# Patient Record
Sex: FEMALE | Race: WHITE | NOT HISPANIC OR LATINO | Employment: UNEMPLOYED | URBAN - METROPOLITAN AREA
[De-identification: names, ages, dates, MRNs, and addresses within clinical notes are randomized per-mention and may not be internally consistent; named-entity substitution may affect disease eponyms.]

---

## 2021-01-21 ENCOUNTER — HOSPITAL ENCOUNTER (OUTPATIENT)
Dept: URGENT CARE | Facility: CLINIC | Age: 31
Discharge: HOME | End: 2021-01-21
Attending: NURSE PRACTITIONER

## 2022-04-06 PROCEDURE — 87491 CHLMYD TRACH DNA AMP PROBE: CPT | Performed by: PHYSICIAN ASSISTANT

## 2022-04-06 PROCEDURE — 87591 N.GONORRHOEAE DNA AMP PROB: CPT | Performed by: PHYSICIAN ASSISTANT

## 2022-04-06 PROCEDURE — 87510 GARDNER VAG DNA DIR PROBE: CPT | Performed by: PHYSICIAN ASSISTANT

## 2022-04-06 PROCEDURE — 87480 CANDIDA DNA DIR PROBE: CPT | Performed by: PHYSICIAN ASSISTANT

## 2022-04-06 PROCEDURE — 87255 GENET VIRUS ISOLATE HSV: CPT | Performed by: PHYSICIAN ASSISTANT

## 2022-04-06 PROCEDURE — 86592 SYPHILIS TEST NON-TREP QUAL: CPT | Performed by: PHYSICIAN ASSISTANT

## 2022-04-06 PROCEDURE — 80074 ACUTE HEPATITIS PANEL: CPT | Performed by: PHYSICIAN ASSISTANT

## 2022-04-06 PROCEDURE — G0432 EIA HIV-1/HIV-2 SCREEN: HCPCS | Performed by: PHYSICIAN ASSISTANT

## 2022-04-06 PROCEDURE — 87660 TRICHOMONAS VAGIN DIR PROBE: CPT | Performed by: PHYSICIAN ASSISTANT

## 2022-04-07 ENCOUNTER — TELEPHONE (OUTPATIENT)
Dept: URGENT CARE | Facility: CLINIC | Age: 32
End: 2022-04-07

## 2022-04-08 ENCOUNTER — TELEPHONE (OUTPATIENT)
Dept: URGENT CARE | Facility: CLINIC | Age: 32
End: 2022-04-08

## 2022-04-08 NOTE — TELEPHONE ENCOUNTER
----- Message from ISREAL Beltran sent at 4/7/2022  9:33 AM EDT -----  Attempted to contact patient regarding positive results of 4/7/2022 at 9:32 AM.  Message left on voicemail to return call.  Please advise patient she has tested positive for gonorrhea, trichomonas, and bacterial vaginosis.  Medication has been sent to the pharmacy to treat trichomonas and bacterial vaginosis.  Patient must return to the clinic for a Rocephin 500 mg IM injection to treat gonorrhea.  Please advise patient to refrain from sexual activity until 1 week after all treatment is completed.

## 2022-04-10 ENCOUNTER — TELEPHONE (OUTPATIENT)
Dept: URGENT CARE | Facility: CLINIC | Age: 32
End: 2022-04-10

## 2022-04-10 NOTE — TELEPHONE ENCOUNTER
Spoke with Magaly, patient's cousin (her mother was listed as alternate contact for patient) and asked her to have patient contact the clinic to discuss test results.     Plan: continue valtrex as prescribed. Contact primary care provider if symptoms persist or return.

## 2022-04-11 ENCOUNTER — TELEPHONE (OUTPATIENT)
Dept: URGENT CARE | Facility: CLINIC | Age: 32
End: 2022-04-11

## 2022-04-11 NOTE — TELEPHONE ENCOUNTER
Discussed results with patient for HSV, gonorrhea, trichomonas, and bacterial vaginosis.  She has a prescription for Flagyl at the pharmacy and was instructed to return to the clinic to receive a Rocephin shot.  Order for Rocephin placed in computer system.

## 2023-09-29 ENCOUNTER — HOSPITAL ENCOUNTER (EMERGENCY)
Facility: HOSPITAL | Age: 33
Discharge: LEFT BEFORE DISCHARGE | End: 2023-09-29
Payer: MEDICAID

## 2023-09-29 PROCEDURE — 99211 OFF/OP EST MAY X REQ PHY/QHP: CPT

## 2024-06-26 ENCOUNTER — APPOINTMENT (OUTPATIENT)
Dept: CT IMAGING | Facility: HOSPITAL | Age: 34
End: 2024-06-26
Payer: MEDICAID

## 2024-06-26 ENCOUNTER — HOSPITAL ENCOUNTER (EMERGENCY)
Facility: HOSPITAL | Age: 34
Discharge: HOME | End: 2024-06-27
Attending: EMERGENCY MEDICINE
Payer: MEDICAID

## 2024-06-26 DIAGNOSIS — N83.202 LEFT OVARIAN CYST: ICD-10-CM

## 2024-06-26 DIAGNOSIS — N12 PYELONEPHRITIS OF LEFT KIDNEY: Primary | ICD-10-CM

## 2024-06-26 DIAGNOSIS — R50.9 FEBRILE ILLNESS: ICD-10-CM

## 2024-06-26 LAB
ALBUMIN SERPL-MCNC: 3.3 G/DL (ref 3.5–5.2)
ALBUMIN/GLOB SERPL: 1.1 G/DL
ALP SERPL-CCNC: 85 U/L (ref 39–117)
ALT SERPL W P-5'-P-CCNC: 10 U/L (ref 1–33)
ANION GAP SERPL CALCULATED.3IONS-SCNC: 7.6 MMOL/L (ref 5–15)
AST SERPL-CCNC: 10 U/L (ref 1–32)
BACTERIA UR QL AUTO: ABNORMAL /HPF
BASOPHILS # BLD AUTO: 0.03 10*3/MM3 (ref 0–0.2)
BASOPHILS NFR BLD AUTO: 0.2 % (ref 0–1.5)
BILIRUB SERPL-MCNC: 0.2 MG/DL (ref 0–1.2)
BILIRUB UR QL STRIP: NEGATIVE
BUN SERPL-MCNC: 9 MG/DL (ref 6–20)
BUN/CREAT SERPL: 23.7 (ref 7–25)
CALCIUM SPEC-SCNC: 8.5 MG/DL (ref 8.6–10.5)
CHLORIDE SERPL-SCNC: 103 MMOL/L (ref 98–107)
CLARITY UR: ABNORMAL
CO2 SERPL-SCNC: 28.4 MMOL/L (ref 22–29)
COLOR UR: YELLOW
CREAT SERPL-MCNC: 0.38 MG/DL (ref 0.57–1)
DEPRECATED RDW RBC AUTO: 46.2 FL (ref 37–54)
EGFRCR SERPLBLD CKD-EPI 2021: 135.9 ML/MIN/1.73
EOSINOPHIL # BLD AUTO: 0.16 10*3/MM3 (ref 0–0.4)
EOSINOPHIL NFR BLD AUTO: 1.3 % (ref 0.3–6.2)
ERYTHROCYTE [DISTWIDTH] IN BLOOD BY AUTOMATED COUNT: 13.4 % (ref 12.3–15.4)
GLOBULIN UR ELPH-MCNC: 3.1 GM/DL
GLUCOSE SERPL-MCNC: 117 MG/DL (ref 65–99)
GLUCOSE UR STRIP-MCNC: NEGATIVE MG/DL
HCG INTACT+B SERPL-ACNC: <0.5 MIU/ML
HCT VFR BLD AUTO: 34.6 % (ref 34–46.6)
HGB BLD-MCNC: 11.4 G/DL (ref 12–15.9)
HGB UR QL STRIP.AUTO: NEGATIVE
HOLD SPECIMEN: NORMAL
HOLD SPECIMEN: NORMAL
HYALINE CASTS UR QL AUTO: ABNORMAL /LPF
IMM GRANULOCYTES # BLD AUTO: 0.04 10*3/MM3 (ref 0–0.05)
IMM GRANULOCYTES NFR BLD AUTO: 0.3 % (ref 0–0.5)
KETONES UR QL STRIP: NEGATIVE
LEUKOCYTE ESTERASE UR QL STRIP.AUTO: ABNORMAL
LIPASE SERPL-CCNC: 21 U/L (ref 13–60)
LYMPHOCYTES # BLD AUTO: 1.65 10*3/MM3 (ref 0.7–3.1)
LYMPHOCYTES NFR BLD AUTO: 12.9 % (ref 19.6–45.3)
MCH RBC QN AUTO: 31 PG (ref 26.6–33)
MCHC RBC AUTO-ENTMCNC: 32.9 G/DL (ref 31.5–35.7)
MCV RBC AUTO: 94 FL (ref 79–97)
MONOCYTES # BLD AUTO: 1.48 10*3/MM3 (ref 0.1–0.9)
MONOCYTES NFR BLD AUTO: 11.6 % (ref 5–12)
NEUTROPHILS NFR BLD AUTO: 73.7 % (ref 42.7–76)
NEUTROPHILS NFR BLD AUTO: 9.41 10*3/MM3 (ref 1.7–7)
NITRITE UR QL STRIP: POSITIVE
NRBC BLD AUTO-RTO: 0 /100 WBC (ref 0–0.2)
PH UR STRIP.AUTO: 6.5 [PH] (ref 5–8)
PLATELET # BLD AUTO: 256 10*3/MM3 (ref 140–450)
PMV BLD AUTO: 10 FL (ref 6–12)
POTASSIUM SERPL-SCNC: 3.8 MMOL/L (ref 3.5–5.2)
PROT SERPL-MCNC: 6.4 G/DL (ref 6–8.5)
PROT UR QL STRIP: NEGATIVE
RBC # BLD AUTO: 3.68 10*6/MM3 (ref 3.77–5.28)
RBC # UR STRIP: ABNORMAL /HPF
REF LAB TEST METHOD: ABNORMAL
SODIUM SERPL-SCNC: 139 MMOL/L (ref 136–145)
SP GR UR STRIP: 1.01 (ref 1–1.03)
SQUAMOUS #/AREA URNS HPF: ABNORMAL /HPF
UROBILINOGEN UR QL STRIP: ABNORMAL
WBC # UR STRIP: ABNORMAL /HPF
WBC NRBC COR # BLD AUTO: 12.77 10*3/MM3 (ref 3.4–10.8)
WHOLE BLOOD HOLD COAG: NORMAL
WHOLE BLOOD HOLD SPECIMEN: NORMAL

## 2024-06-26 PROCEDURE — 25510000001 IOPAMIDOL PER 1 ML

## 2024-06-26 PROCEDURE — 99285 EMERGENCY DEPT VISIT HI MDM: CPT

## 2024-06-26 PROCEDURE — 83690 ASSAY OF LIPASE: CPT

## 2024-06-26 PROCEDURE — 80053 COMPREHEN METABOLIC PANEL: CPT

## 2024-06-26 PROCEDURE — 81001 URINALYSIS AUTO W/SCOPE: CPT

## 2024-06-26 PROCEDURE — 74177 CT ABD & PELVIS W/CONTRAST: CPT

## 2024-06-26 PROCEDURE — 85025 COMPLETE CBC W/AUTO DIFF WBC: CPT

## 2024-06-26 PROCEDURE — 36415 COLL VENOUS BLD VENIPUNCTURE: CPT

## 2024-06-26 PROCEDURE — 84702 CHORIONIC GONADOTROPIN TEST: CPT

## 2024-06-26 RX ORDER — KETOROLAC TROMETHAMINE 30 MG/ML
30 INJECTION, SOLUTION INTRAMUSCULAR; INTRAVENOUS ONCE
Status: COMPLETED | OUTPATIENT
Start: 2024-06-27 | End: 2024-06-27

## 2024-06-26 RX ORDER — SODIUM CHLORIDE 0.9 % (FLUSH) 0.9 %
10 SYRINGE (ML) INJECTION AS NEEDED
Status: DISCONTINUED | OUTPATIENT
Start: 2024-06-26 | End: 2024-06-27 | Stop reason: HOSPADM

## 2024-06-26 RX ADMIN — IOPAMIDOL 75 ML: 755 INJECTION, SOLUTION INTRAVENOUS at 23:44

## 2024-06-27 VITALS
BODY MASS INDEX: 23.83 KG/M2 | OXYGEN SATURATION: 98 % | HEIGHT: 63 IN | HEART RATE: 84 BPM | TEMPERATURE: 98.6 F | RESPIRATION RATE: 18 BRPM | DIASTOLIC BLOOD PRESSURE: 61 MMHG | WEIGHT: 134.48 LBS | SYSTOLIC BLOOD PRESSURE: 135 MMHG

## 2024-06-27 PROCEDURE — 25010000002 KETOROLAC TROMETHAMINE PER 15 MG: Performed by: EMERGENCY MEDICINE

## 2024-06-27 PROCEDURE — 96365 THER/PROPH/DIAG IV INF INIT: CPT

## 2024-06-27 PROCEDURE — 25010000002 CEFTRIAXONE PER 250 MG: Performed by: EMERGENCY MEDICINE

## 2024-06-27 PROCEDURE — 96375 TX/PRO/DX INJ NEW DRUG ADDON: CPT

## 2024-06-27 PROCEDURE — 25810000003 SODIUM CHLORIDE 0.9 % SOLUTION: Performed by: EMERGENCY MEDICINE

## 2024-06-27 RX ORDER — HYDROCODONE BITARTRATE AND ACETAMINOPHEN 5; 325 MG/1; MG/1
1 TABLET ORAL EVERY 6 HOURS PRN
Qty: 10 TABLET | Refills: 0 | Status: SHIPPED | OUTPATIENT
Start: 2024-06-27

## 2024-06-27 RX ORDER — IBUPROFEN 800 MG/1
800 TABLET ORAL EVERY 8 HOURS PRN
Qty: 20 TABLET | Refills: 0 | Status: SHIPPED | OUTPATIENT
Start: 2024-06-27

## 2024-06-27 RX ORDER — SULFAMETHOXAZOLE AND TRIMETHOPRIM 800; 160 MG/1; MG/1
1 TABLET ORAL 2 TIMES DAILY
Qty: 14 TABLET | Refills: 0 | Status: SHIPPED | OUTPATIENT
Start: 2024-06-27 | End: 2024-07-04

## 2024-06-27 RX ADMIN — KETOROLAC TROMETHAMINE 30 MG: 30 INJECTION, SOLUTION INTRAMUSCULAR; INTRAVENOUS at 00:19

## 2024-06-27 RX ADMIN — SODIUM CHLORIDE 1000 ML: 9 INJECTION, SOLUTION INTRAVENOUS at 00:16

## 2024-06-27 RX ADMIN — CEFTRIAXONE SODIUM 2000 MG: 2 INJECTION, POWDER, FOR SOLUTION INTRAMUSCULAR; INTRAVENOUS at 00:22

## 2024-06-27 NOTE — DISCHARGE INSTRUCTIONS
Your CT scan shows that you have a urinary tract infection that has spread up into the left kidney and that will cause fevers and back pain.    Drink plenty of fluids to stay hydrated and flush the urinary tract of infection and take the antibiotics as directed until all finished.    Return to the ER if you get any worse.    No kidney stones were seen on your CT scan but you do have a 4 cm left ovarian cyst that typically will run its course on its own but you can follow-up with your OB/GYN for that.

## 2024-06-27 NOTE — ED PROVIDER NOTES
"Time: 10:25 PM EDT  Date of encounter:  2024  Independent Historian/Clinical History and Information was obtained by:   Patient    History is limited by: N/A    Chief Complaint: Fever        History of Present Illness:  Patient is a 33 y.o. year old female who presents to the emergency department for evaluation of fever.  Patient reports that she has had a fever for approximately 5 days.  She is also complaining of low back pain that wraps around her left flank and into her lower abdomen.  Denies dysuria but reports urinary frequency.  Denies nausea and vomiting.  No history of pyelonephritis or kidney stones.    HPI    Patient Care Team  Primary Care Provider: Provider, No Known    Past Medical History:     No Known Allergies  History reviewed. No pertinent past medical history.  Past Surgical History:   Procedure Laterality Date     SECTION       History reviewed. No pertinent family history.    Home Medications:  Prior to Admission medications    Medication Sig Start Date End Date Taking? Authorizing Provider   metroNIDAZOLE (FLAGYL) 500 MG tablet Take 1 tablet by mouth 2 (Two) Times a Day. 22   Norah Stapleton APRN        Social History:   Social History     Tobacco Use    Smoking status: Every Day     Current packs/day: 0.50     Types: Cigarettes   Substance Use Topics    Drug use: Never         Review of Systems:  Review of Systems   Constitutional:  Positive for fever.   Gastrointestinal:  Positive for abdominal pain.   Genitourinary:  Positive for flank pain and frequency.   Musculoskeletal:  Positive for back pain.        Physical Exam:  /94 (BP Location: Left arm, Patient Position: Sitting)   Pulse 86   Temp 98.5 °F (36.9 °C) (Oral)   Resp 18   Ht 160 cm (63\")   Wt 61 kg (134 lb 7.7 oz)   SpO2 98%   BMI 23.82 kg/m²       General: Awake alert and in mild distress    HEENT: Head normocephalic atraumatic, eyes PERRLA EOMI, nose normal, oropharynx normal.    Neck: Supple " full range of motion, no meningismus, no lymphadenopathy    Heart: Regular rate and rhythm, no murmurs or rubs, 2+ radial pulses bilaterally    Lungs: Clear to auscultation bilaterally without wheezes or crackles, no respiratory distress    Abdomen: Soft, mild left lower and left upper quadrant tenderness and CVA tenderness, nondistended, no rebound or guarding    Skin: Warm, dry, no rash    Musculoskeletal: Normal range of motion, no lower extremity edema    Neurologic: Oriented x3, no motor deficits no sensory deficits    Psychiatric: Mood appears stable, no psychosis          Procedures:  Procedures      Medical Decision Making:      Comorbidities that affect care:    None    External Notes reviewed:    None      The following orders were placed and all results were independently analyzed by me:  Orders Placed This Encounter   Procedures    Urine Culture - Urine, Urine, Clean Catch    CT Abdomen Pelvis With Contrast    [place] Draw    Comprehensive Metabolic Panel    Lipase    Urinalysis With Microscopic If Indicated (No Culture) - Urine, Clean Catch    hCG, Quantitative, Pregnancy    CBC Auto Differential    Urinalysis, Microscopic Only - Urine, Clean Catch    Lactic Acid, Plasma    NPO Diet NPO Type: Strict NPO    Undress & Gown    Insert Peripheral IV    Insert Peripheral IV    CBC & Differential    Green Top (Gel)    Lavender Top    Gold Top - SST    Light Blue Top       Medications Given in the Emergency Department:  Medications   sodium chloride 0.9 % flush 10 mL (has no administration in time range)   sodium chloride 0.9 % flush 10 mL (has no administration in time range)   sodium chloride 0.9 % bolus 1,000 mL (1,000 mL Intravenous New Bag 6/27/24 0016)   cefTRIAXone (ROCEPHIN) 2,000 mg in sodium chloride 0.9 % 100 mL IVPB-VTB (2,000 mg Intravenous New Bag 6/27/24 0022)   iopamidol (ISOVUE-370) 76 % injection 100 mL (75 mL Intravenous Given 6/26/24 2444)   ketorolac (TORADOL) injection 30 mg (30 mg  Intravenous Given 6/27/24 0019)        ED Course:    ED Course as of 06/27/24 0033   Wed Jun 26, 2024 2215 -- PROVIDER IN TRIAGE NOTE ---    The patient was evaluated by me, ISREAL Hall, in triage. Orders were placed and the patient is currently awaiting disposition.    [CB]      ED Course User Index  [CB] Alexandra Henriquez APRN       Labs:    Lab Results (last 24 hours)       Procedure Component Value Units Date/Time    CBC & Differential [[id number]]  (Abnormal) Collected: 06/26/24 2229    Specimen: Blood from Arm, Right Updated: 06/26/24 2236    Narrative:      The following orders were created for panel order CBC & Differential.  Procedure                               Abnormality         Status                     ---------                               -----------         ------                     CBC Auto Differential[[id number]]        Abnormal            Final result                 Please view results for these tests on the individual orders.    Comprehensive Metabolic Panel [[id number]]  (Abnormal) Collected: 06/26/24 2229    Specimen: Blood from Arm, Right Updated: 06/26/24 2259     Glucose 117 mg/dL      BUN 9 mg/dL      Creatinine 0.38 mg/dL      Sodium 139 mmol/L      Potassium 3.8 mmol/L      Chloride 103 mmol/L      CO2 28.4 mmol/L      Calcium 8.5 mg/dL      Total Protein 6.4 g/dL      Albumin 3.3 g/dL      ALT (SGPT) 10 U/L      AST (SGOT) 10 U/L      Alkaline Phosphatase 85 U/L      Total Bilirubin 0.2 mg/dL      Globulin 3.1 gm/dL      A/G Ratio 1.1 g/dL      BUN/Creatinine Ratio 23.7     Anion Gap 7.6 mmol/L      eGFR 135.9 mL/min/1.73     Narrative:      GFR Normal >60  Chronic Kidney Disease <60  Kidney Failure <15      Lipase [[id number]]  (Normal) Collected: 06/26/24 2229    Specimen: Blood from Arm, Right Updated: 06/26/24 2259     Lipase 21 U/L     hCG, Quantitative, Pregnancy [[id number]] Collected: 06/26/24 2229    Specimen: Blood from Arm, Right Updated: 06/26/24 2254     HCG  Quantitative <0.50 mIU/mL     Narrative:      HCG Ranges by Gestational Age    Females - non-pregnant premenopausal   </= 1mIU/mL HCG  Females - postmenopausal               </= 7mIU/mL HCG    3 Weeks       5.4   -      72 mIU/mL  4 Weeks      10.2   -     708 mIU/mL  5 Weeks       217   -   8,245 mIU/mL  6 Weeks       152   -  32,177 mIU/mL  7 Weeks     4,059   - 153,767 mIU/mL  8 Weeks    31,366   - 149,094 mIU/mL  9 Weeks    59,109   - 135,901 mIU/mL  10 Weeks   44,186   - 170,409 mIU/mL  12 Weeks   27,107   - 201,615 mIU/mL  14 Weeks   24,302   -  93,646 mIU/mL  15 Weeks   12,540   -  69,747 mIU/mL  16 Weeks    8,904   -  55,332 mIU/mL  17 Weeks    8,240   -  51,793 mIU/mL  18 Weeks    9,649   -  55,271 mIU/mL      CBC Auto Differential [[id number]]  (Abnormal) Collected: 06/26/24 2229    Specimen: Blood from Arm, Right Updated: 06/26/24 2236     WBC 12.77 10*3/mm3      RBC 3.68 10*6/mm3      Hemoglobin 11.4 g/dL      Hematocrit 34.6 %      MCV 94.0 fL      MCH 31.0 pg      MCHC 32.9 g/dL      RDW 13.4 %      RDW-SD 46.2 fl      MPV 10.0 fL      Platelets 256 10*3/mm3      Neutrophil % 73.7 %      Lymphocyte % 12.9 %      Monocyte % 11.6 %      Eosinophil % 1.3 %      Basophil % 0.2 %      Immature Grans % 0.3 %      Neutrophils, Absolute 9.41 10*3/mm3      Lymphocytes, Absolute 1.65 10*3/mm3      Monocytes, Absolute 1.48 10*3/mm3      Eosinophils, Absolute 0.16 10*3/mm3      Basophils, Absolute 0.03 10*3/mm3      Immature Grans, Absolute 0.04 10*3/mm3      nRBC 0.0 /100 WBC     Urinalysis With Microscopic If Indicated (No Culture) - [[id number]]  (Abnormal) Collected: 06/26/24 2233    Specimen: Urine Updated: 06/26/24 2253     Color, UA Yellow     Appearance, UA Cloudy     pH, UA 6.5     Specific Gravity, UA 1.014     Glucose, UA Negative     Ketones, UA Negative     Bilirubin, UA Negative     Blood, UA Negative     Protein, UA Negative     Leuk Esterase, UA Moderate (2+)     Nitrite, UA Positive      Urobilinogen, UA 2.0 E.U./dL    Urinalysis, Microscopic Only - Urine, Clean Catch [[id number]]  (Abnormal) Collected: 06/26/24 2233    Specimen: Urine Updated: 06/26/24 2253     RBC, UA 0-2 /HPF      WBC, UA 21-50 /HPF      Bacteria, UA 4+ /HPF      Squamous Epithelial Cells, UA 0-2 /HPF      Hyaline Casts, UA 7-12 /LPF      Methodology Automated Microscopy             Imaging:    CT Abdomen Pelvis With Contrast    Result Date: 6/26/2024  CT ABDOMEN PELVIS W CONTRAST Date of Exam: 6/26/2024 11:37 PM EDT Indication: Abdominal pain and flank pain with fever and headache. Comparison: 7/11/2023 Technique: Axial CT images were obtained of the abdomen and pelvis after the uneventful intravenous administration of iodinated contrast. Reconstructed coronal and sagittal images were also obtained. Automated exposure control and iterative construction methods were used. Findings: Lung bases are clear. Abdominal aorta is normal in caliber. Gallbladder is partially contracted. No biliary obstruction. The liver remains enlarged with the right lobe measuring at least 22.3 cm in length. There is patchy enhancement in the left kidney with surrounding fat stranding compatible with pyelonephritis. No drainable abscess is seen. The right kidney is normal. Both kidneys are nonobstructed. Solid abdominal organs are otherwise normal. Urinary bladder is grossly normal. Patient is status post tubal ligation. There is a left ovarian cyst measuring 4.1 cm. Trace free fluid in the cul-de-sac may be physiologic or reactive. The appendix is normal. There is some mild wall thickening in the ascending colon that could reflect a mild colitis. The remainder of the large bowel is normal. It contains formed stool. A long redundant sigmoid colon loop is present. No small bowel obstruction. The stomach is normal. Umbilical hernia is again seen containing abdominal fat. It contains some fluid on the current study that is nonspecific and likely reactive. No  definite adenopathy. There is mild chronic wedging at T12 and L1.     1. Left-sided pyelonephritis with no drainable abscess. No hydronephrosis. 2. Persistent hepatomegaly. 3. 4.1 cm left ovarian cyst. 4. Trace free fluid in the cul-de-sac that may be physiologic or reactive. 5. Persistent umbilical hernia containing abdominal fat. Today it contains some nonspecific fluid, also probably reactive. 6. Mild wall thickening the ascending colon. Mild segmental colitis should be excluded. The large bowel is otherwise normal, and contains formed stool. The appendix is normal Electronically Signed: Sudhir Chappell MD  6/26/2024 11:58 PM EDT  Workstation ID: [id number]       Differential Diagnosis and Discussion:    Fever: Based on the complaint of fever, differential diagnosis includes but is not limited to meningitis, pneumonia, pyelonephritis, acute uti,  systemic immune response syndrome, sepsis, viral syndrome, fungal infection, tick born illness and other bacterial infections.  Flank Pain: Differential diagnosis includes but is not limited to kidney stones, pyelonephritis, musculoskeletal disorders, renal infarction, urinary tract infection, hydronephrosis, radiculopathy, aortic aneurysm, renal cell carcinoma.    All labs were reviewed and interpreted by me.  CT scan radiology impression was interpreted by me.    MDM     Amount and/or Complexity of Data Reviewed  Clinical lab tests: reviewed  Tests in the radiology section of CPT®: reviewed  Decide to obtain previous medical records or to obtain history from someone other than the patient: yes           This patient is a 33-year-old female that has had a few days of fevers that home and sweats and chills and also some urinary frequency and flank pains.    Clinically it looks like she probably has acute pyelonephritis as she has a nitrite positive UTI and elevated white count of 12.7.    We will get a CT of the abdomen pelvis to rule out any obstructive uropathy from  stones.    CT reviewed the images and it shows signs of pyelonephritis of the left kidney but no obstructive uropathy or stones, incidental finding of 4 cm left ovarian cyst noted    I am starting IV ceftriaxone 2 g in the ED for treatment of UTI/pyelonephritis and also giving her some IV fluids and Toradol for symptom relief.      She looks reasonably healthy and no signs of toxicity at this point and I think she can be managed as an outpatient on oral Bactrim DS antibiotics and some pain medicine and return precautions for worsening symptoms.                    Patient Care Considerations:          Consultants/Shared Management Plan:        Social Determinants of Health:    Patient is independent, reliable, and has access to care.       Disposition and Care Coordination:    Discharged: I considered escalation of care by admitting this patient to the hospital, however patient is nontoxic-appearing and can likely be managed safely as an outpatient on oral antibiotics.    I have explained the patient´s condition, diagnoses and treatment plan based on the information available to me at this time. I have answered questions and addressed any concerns. The patient has a good  understanding of the patient´s diagnosis, condition, and treatment plan as can be expected at this point. The vital signs have been stable. The patient´s condition is stable and appropriate for discharge from the emergency department.      The patient will pursue further outpatient evaluation with the primary care physician or other designated or consulting physician as outlined in the discharge instructions. They are agreeable to this plan of care and follow-up instructions have been explained in detail. The patient has received these instructions in written format and has expressed an understanding of the discharge instructions. The patient is aware that any significant change in condition or worsening of symptoms should prompt an immediate return  to this or the closest emergency department or call to 911.  I have explained discharge medications and the need for follow up with the patient/caretakers. This was also printed in the discharge instructions. Patient was discharged with the following medications and follow up:      Medication List        New Prescriptions      HYDROcodone-acetaminophen 5-325 MG per tablet  Commonly known as: NORCO  Take 1 tablet by mouth Every 6 (Six) Hours As Needed for Severe Pain.     ibuprofen 800 MG tablet  Commonly known as: ADVIL,MOTRIN  Take 1 tablet by mouth Every 8 (Eight) Hours As Needed for Fever or Moderate Pain.     sulfamethoxazole-trimethoprim 800-160 MG per tablet  Commonly known as: BACTRIM DS,SEPTRA DS  Take 1 tablet by mouth 2 (Two) Times a Day for 7 days.               Where to Get Your Medications        These medications were sent to [id number] - [place], [place] - [address] AT [facility] - [phone] [facility] [phone] FX  [address] [place] [address]      Phone: [phone]   HYDROcodone-acetaminophen 5-325 MG per tablet  ibuprofen 800 MG tablet  sulfamethoxazole-trimethoprim 800-160 MG per tablet      Provider, No Known  [facility]  [place] [address]    Call in 2 days  As needed, If symptoms worsen, for a follow-up appointment       Final diagnoses:   Pyelonephritis of left kidney   Left ovarian cyst   Febrile illness        ED Disposition       ED Disposition   Discharge    Condition   Stable    Comment   --               This medical record created using voice recognition software.             José Miguel Rodriguez MD  06/27/24 0033